# Patient Record
Sex: MALE | Race: WHITE
[De-identification: names, ages, dates, MRNs, and addresses within clinical notes are randomized per-mention and may not be internally consistent; named-entity substitution may affect disease eponyms.]

---

## 2022-01-05 ENCOUNTER — HOSPITAL ENCOUNTER (INPATIENT)
Dept: HOSPITAL 79 - ER1 | Age: 72
LOS: 2 days | Discharge: HOME | DRG: 193 | End: 2022-01-07
Attending: INTERNAL MEDICINE | Admitting: HOSPITALIST
Payer: MEDICARE

## 2022-01-05 VITALS — BODY MASS INDEX: 36.45 KG/M2 | WEIGHT: 275 LBS | HEIGHT: 73 IN

## 2022-01-05 DIAGNOSIS — E66.9: ICD-10-CM

## 2022-01-05 DIAGNOSIS — I35.0: ICD-10-CM

## 2022-01-05 DIAGNOSIS — I48.92: ICD-10-CM

## 2022-01-05 DIAGNOSIS — Z79.01: ICD-10-CM

## 2022-01-05 DIAGNOSIS — Z79.82: ICD-10-CM

## 2022-01-05 DIAGNOSIS — Z20.822: ICD-10-CM

## 2022-01-05 DIAGNOSIS — I11.0: ICD-10-CM

## 2022-01-05 DIAGNOSIS — Z95.5: ICD-10-CM

## 2022-01-05 DIAGNOSIS — I25.10: ICD-10-CM

## 2022-01-05 DIAGNOSIS — J20.9: ICD-10-CM

## 2022-01-05 DIAGNOSIS — J45.21: ICD-10-CM

## 2022-01-05 DIAGNOSIS — J96.01: ICD-10-CM

## 2022-01-05 DIAGNOSIS — J18.9: Primary | ICD-10-CM

## 2022-01-05 DIAGNOSIS — I50.9: ICD-10-CM

## 2022-01-05 DIAGNOSIS — E78.5: ICD-10-CM

## 2022-01-05 LAB
HGB BLD-MCNC: 17.2 GM/DL (ref 14–17.5)
RED BLOOD COUNT: 5.34 M/UL (ref 4.2–5.5)
WHITE BLOOD COUNT: 7.2 K/UL (ref 4.5–11)

## 2022-01-05 PROCEDURE — U0002 COVID-19 LAB TEST NON-CDC: HCPCS

## 2022-01-06 LAB
BUN/CREATININE RATIO: 24 (ref 0–10)
BUN/CREATININE RATIO: 25 (ref 0–10)
HGB BLD-MCNC: 16.2 GM/DL (ref 14–17.5)
RED BLOOD COUNT: 5.13 M/UL (ref 4.2–5.5)
WHITE BLOOD COUNT: 5.8 K/UL (ref 4.5–11)

## 2022-01-06 NOTE — NUR
HR DROPPED IN 30'S WHILE SLEEPING PER TELE. UPON AWAKENING PT, HR CAME BACK
INTO THE 60'S. PT ASYMPTOMATIC OTHERWISE. DR SAMSON NOTIFIED. MED CHANGES WERE
MADE. ELIN.

## 2022-01-07 LAB
BUN/CREATININE RATIO: 27 (ref 0–10)
HGB BLD-MCNC: 15.2 GM/DL (ref 14–17.5)
RED BLOOD COUNT: 4.87 M/UL (ref 4.2–5.5)
WHITE BLOOD COUNT: 7 K/UL (ref 4.5–11)

## 2022-01-07 PROCEDURE — B24BZZZ ULTRASONOGRAPHY OF HEART WITH AORTA: ICD-10-PCS | Performed by: INTERNAL MEDICINE
